# Patient Record
Sex: FEMALE | Race: WHITE
[De-identification: names, ages, dates, MRNs, and addresses within clinical notes are randomized per-mention and may not be internally consistent; named-entity substitution may affect disease eponyms.]

---

## 2017-10-28 ENCOUNTER — HOSPITAL ENCOUNTER (EMERGENCY)
Dept: HOSPITAL 75 - ER | Age: 37
Discharge: HOME | End: 2017-10-28
Payer: COMMERCIAL

## 2017-10-28 VITALS — BODY MASS INDEX: 22.76 KG/M2 | HEIGHT: 67 IN | WEIGHT: 145 LBS

## 2017-10-28 VITALS — SYSTOLIC BLOOD PRESSURE: 121 MMHG | DIASTOLIC BLOOD PRESSURE: 88 MMHG
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
## 2017-10-28 DIAGNOSIS — S93.401A: Primary | ICD-10-CM
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
## 2017-10-28 PROCEDURE — 73610 X-RAY EXAM OF ANKLE: CPT

## 2017-10-28 PROCEDURE — 99283 EMERGENCY DEPT VISIT LOW MDM: CPT

## 2017-10-28 NOTE — ED LOWER EXTREMITY
General


Chief Complaint:  Lower Extremity


Stated Complaint:  R FOOT PAIN FROM FALL


Source:  patient


Exam Limitations:  no limitations





History of Present Illness


Time seen by provider:  19:04


Initial Comments


To ER with right ankle pain after she fell off of her porch at home twisting 

her ankle on the way down and caught in the bushes.  She's been unable to bear 

weight on this since this happened.  Pain is only ankle.  There is no pain in 

the foot or in the knee.


Onset:  just prior to arrival


Severity:  moderate


Pain/Injury Location:  right ankle


Method of Injury:  fell, twisted


Modifying Factors:  Worse With Movement





Allergies and Home Medications


Allergies


Coded Allergies:  


     Morphine (Unverified  Allergy, Mild, 10/23/09)





Home Medications


Thyroid,Pork 130 Mg Tablet, (Reported)





Constitutional:  see HPI


EENTM:  see HPI


Respiratory:  no symptoms reported


Cardiovascular:  no symptoms reported


Genitourinary:  no symptoms reported


Musculoskeletal:  see HPI


Skin:  no symptoms reported


Psychiatric/Neurological:  No Symptoms Reported





Past Medical-Social-Family Hx


Patient Social History


Recent Foreign Travel:  No


Contact w/Someone Who Travel:  No





Reproductive System


Hx Reproductive Disorders:  No





Physical Exam


Vital Signs





Vital Sign - Last 12Hours








 10/28/17





 18:55


 


Temp 97.0


 


Pulse 95


 


Resp 20


 


B/P (MAP) 121/88


 


Pulse Ox 100


 


O2 Delivery Room Air





Capillary Refill :


General Appearance:  WD/WN, no apparent distress


HEENT:  PERRL/EOMI, normal ENT inspection


Neck:  non-tender, full range of motion


Respiratory:  no respiratory distress, no accessory muscle use


Hips:  bilateral hip non-tender, bilateral hip normal inspection, bilateral hip 

normal range of motion


Legs:  bilateral leg non-tender, bilateral leg normal inspection, bilateral leg 

normal range of motion


Knees:  bilateral knee non-tender, bilateral knee normal inspection, bilateral 

knee normal range of motion


Ankles:  right ankle pain, right ankle soft tissue tenderness, right ankle 

swelling


Feet:  bilateral foot non-tender, bilateral foot normal inspection, bilateral 

foot normal range of motion


Neurologic/Psychiatric:  alert, normal mood/affect, oriented x 3


Skin:  normal color, warm/dry


Comments


Distally she is neurovascularly intact





Progress/Results/Core Measures


Results/Orders


My Orders





Orders - SOILA MALDONADO


Ankle, Right, 3 Views (10/28/17 19:08)





Vital Signs/I&O





Vital Sign - Last 12Hours








 10/28/17





 18:55


 


Temp 97.0


 


Pulse 95


 


Resp 20


 


B/P (MAP) 121/88


 


Pulse Ox 100


 


O2 Delivery Room Air











Departure


Impression


Impression:  


 Primary Impression:  


 Ankle sprain


 Qualified Codes:  S93.401A - Sprain of unspecified ligament of right ankle, 

initial encounter


Disposition:  01 HOME, SELF-CARE


Condition:  Stable





Departure-Patient Inst.


Decision time for Depature:  19:27


Referrals:  


FANTASMA VIVAS DO (PCP)


Primary Care Physician








NAOMIE CAMPO DO (Family)


Primary Care Physician


Patient Instructions:  Ankle Sprain (DC)





Add. Discharge Instructions:  


1.  Rest, limit weightbearing.  It hurts to bear weight and use crutches when 

walking.  When you feel as though your able to bear weight on your left foot 

without pain in the ankle and you may do so.  Expect some swelling and bruising 

over the next 24-48 hours. Elevating the ankle, use of an ace wrap, and Ice 

pack will help with this. If pain persists beyond 1.5-2 weeks then follow up 

with Dr Vivas to discusse an MRI to further evaluate the ankle. Take tylenol 

and motrin for pain and return to ER for any concerning symptoms. Wear the ace 

wrap for a minimum of 48 hours and use an ice pack at 30 minute intervals for 

that same duration. 








All discharge instructions reviewed with patient and/or family. Voiced 

understanding.





Copy


Copies To 1:   FANTASMA VIVAS PETER J APRN Oct 28, 2017 19:05

## 2017-10-28 NOTE — DIAGNOSTIC IMAGING REPORT
INDICATION: Fall.



FINDINGS: There is mild soft tissue swelling of the lateral

malleolus. Ankle mortise is in good alignment. Articulating

surfaces are smooth. Joint spaces are well-preserved. There are

no fractures.



IMPRESSION: Mild soft tissue swelling with no bony abnormality.



Dictated by: 



  Dictated on workstation # [id number]

## 2017-11-28 ENCOUNTER — HOSPITAL ENCOUNTER (OUTPATIENT)
Dept: HOSPITAL 75 - ER | Age: 37
Setting detail: OBSERVATION
LOS: 2 days | Discharge: HOME | End: 2017-11-30
Attending: FAMILY MEDICINE | Admitting: FAMILY MEDICINE
Payer: COMMERCIAL

## 2017-11-28 VITALS — SYSTOLIC BLOOD PRESSURE: 101 MMHG | DIASTOLIC BLOOD PRESSURE: 75 MMHG

## 2017-11-28 VITALS — WEIGHT: 164 LBS | HEIGHT: 67 IN | BODY MASS INDEX: 25.74 KG/M2

## 2017-11-28 DIAGNOSIS — E03.9: ICD-10-CM

## 2017-11-28 DIAGNOSIS — N10: Primary | ICD-10-CM

## 2017-11-28 DIAGNOSIS — K52.9: ICD-10-CM

## 2017-11-28 DIAGNOSIS — Z23: ICD-10-CM

## 2017-11-28 DIAGNOSIS — E86.0: ICD-10-CM

## 2017-11-28 DIAGNOSIS — Z79.899: ICD-10-CM

## 2017-11-28 LAB
ALBUMIN SERPL-MCNC: 3.8 GM/DL (ref 3.2–4.5)
ALT SERPL-CCNC: 182 U/L (ref 0–55)
ANION GAP SERPL CALC-SCNC: 11 MMOL/L (ref 5–14)
APTT BLD: 26 SEC (ref 24–35)
AST SERPL-CCNC: 83 U/L (ref 5–34)
BASOPHILS # BLD AUTO: 0 10^3/UL (ref 0–0.1)
BASOPHILS NFR BLD AUTO: 0 % (ref 0–10)
BASOPHILS NFR BLD MANUAL: 0 %
BILIRUB SERPL-MCNC: 0.7 MG/DL (ref 0.1–1)
BILIRUB UR QL STRIP: NEGATIVE
BUN SERPL-MCNC: 17 MG/DL (ref 7–18)
BUN/CREAT SERPL: 22
CALCIUM SERPL-MCNC: 8.9 MG/DL (ref 8.5–10.1)
CHLORIDE SERPL-SCNC: 102 MMOL/L (ref 98–107)
CO2 SERPL-SCNC: 25 MMOL/L (ref 21–32)
CREAT SERPL-MCNC: 0.77 MG/DL (ref 0.6–1.3)
EOSINOPHIL # BLD AUTO: 0 10^3/UL (ref 0–0.3)
EOSINOPHIL NFR BLD AUTO: 0 % (ref 0–10)
EOSINOPHIL NFR BLD MANUAL: 0 %
ERYTHROCYTE [DISTWIDTH] IN BLOOD BY AUTOMATED COUNT: 12.5 % (ref 10–14.5)
GFR SERPLBLD BASED ON 1.73 SQ M-ARVRAT: > 60 ML/MIN
GLUCOSE SERPL-MCNC: 89 MG/DL (ref 70–105)
INR PPP: 0.9 (ref 0.8–1.4)
KETONES UR QL STRIP: NEGATIVE
LEUKOCYTE ESTERASE UR QL STRIP: (no result)
LYMPHOCYTES # BLD AUTO: 0.5 X 10^3 (ref 1–4)
LYMPHOCYTES NFR BLD AUTO: 6 % (ref 12–44)
MCH RBC QN AUTO: 30 PG (ref 25–34)
MCHC RBC AUTO-ENTMCNC: 35 G/DL (ref 32–36)
MCV RBC AUTO: 87 FL (ref 80–99)
MONOCYTES # BLD AUTO: 0.4 X 10^3 (ref 0–1)
MONOCYTES NFR BLD AUTO: 4 % (ref 0–12)
NEUTROPHILS # BLD AUTO: 8 X 10^3 (ref 1.8–7.8)
NEUTROPHILS NFR BLD AUTO: 90 % (ref 42–75)
NEUTS BAND NFR BLD MANUAL: 70 %
NEUTS BAND NFR BLD: 22 %
NITRITE UR QL STRIP: NEGATIVE
PH UR STRIP: 6 [PH] (ref 5–9)
PLATELET # BLD: 240 10^3/UL (ref 130–400)
PMV BLD AUTO: 9.5 FL (ref 7.4–10.4)
POTASSIUM SERPL-SCNC: 3.8 MMOL/L (ref 3.6–5)
PROT SERPL-MCNC: 6.3 GM/DL (ref 6.4–8.2)
PROT UR QL STRIP: NEGATIVE
PROTHROMBIN TIME: 11.8 SEC (ref 12.2–14.7)
RBC # BLD AUTO: 4.92 10^6/UL (ref 4.35–5.85)
SODIUM SERPL-SCNC: 138 MMOL/L (ref 135–145)
SP GR UR STRIP: 1.02 (ref 1.02–1.02)
SQUAMOUS #/AREA URNS HPF: >50 /HPF
UROBILINOGEN UR-MCNC: 1 MG/DL
VARIANT LYMPHS NFR BLD MANUAL: 8 %
WBC # BLD AUTO: 8.9 10^3/UL (ref 4.3–11)
WBC #/AREA URNS HPF: (no result) /HPF

## 2017-11-28 PROCEDURE — 87088 URINE BACTERIA CULTURE: CPT

## 2017-11-28 PROCEDURE — 87804 INFLUENZA ASSAY W/OPTIC: CPT

## 2017-11-28 PROCEDURE — 71010: CPT

## 2017-11-28 PROCEDURE — 83605 ASSAY OF LACTIC ACID: CPT

## 2017-11-28 PROCEDURE — 84443 ASSAY THYROID STIM HORMONE: CPT

## 2017-11-28 PROCEDURE — 85025 COMPLETE CBC W/AUTO DIFF WBC: CPT

## 2017-11-28 PROCEDURE — 74177 CT ABD & PELVIS W/CONTRAST: CPT

## 2017-11-28 PROCEDURE — 84703 CHORIONIC GONADOTROPIN ASSAY: CPT

## 2017-11-28 PROCEDURE — 85730 THROMBOPLASTIN TIME PARTIAL: CPT

## 2017-11-28 PROCEDURE — 87040 BLOOD CULTURE FOR BACTERIA: CPT

## 2017-11-28 PROCEDURE — 36415 COLL VENOUS BLD VENIPUNCTURE: CPT

## 2017-11-28 PROCEDURE — 84439 ASSAY OF FREE THYROXINE: CPT

## 2017-11-28 PROCEDURE — 85610 PROTHROMBIN TIME: CPT

## 2017-11-28 PROCEDURE — 80053 COMPREHEN METABOLIC PANEL: CPT

## 2017-11-28 PROCEDURE — 85027 COMPLETE CBC AUTOMATED: CPT

## 2017-11-28 PROCEDURE — 85007 BL SMEAR W/DIFF WBC COUNT: CPT

## 2017-11-28 PROCEDURE — 81000 URINALYSIS NONAUTO W/SCOPE: CPT

## 2017-11-28 NOTE — DIAGNOSTIC IMAGING REPORT
Portable erect AP chest at 744 hours.



INDICATION: Fever.



There are no prior studies available for comparison.



FINDINGS: The heart size is within normal limits. The lungs are

clear. There is no evidence for failure, pneumonia or for pleural

effusion. Mediastinum is not widened. The osseous structures are

intact. There is deformity of the left clavicle due to prior

trauma.



IMPRESSION: There is no evidence for an acute cardiopulmonary

abnormality.



Dictated by: 



  Dictated on workstation # [id number]

## 2017-11-28 NOTE — ED GENERAL
General


Chief Complaint:  Fever-Adult/Adol


Stated Complaint:  FEVER


Nursing Triage Note:  


PT HERE WITH C/O FEVER, N/V/ FOR 2 DAYS. FEVERS -103, NO URINE OUTPUT FOR 


22 HOURS, PT REPORTS TRAVELING OVER 1600 MILES THIS WEEK IN A CAR.


Nursing Sepsis Screen:  Possible Sepsis Risk


Source of Information:  Patient


Exam Limitations:  No Limitations





History of Present Illness


Time Seen by Provider:  19:11


Initial Comments


Here with report of fever with nausea and vomiting over the last 24-48 hours.  

States that she has not been him to keep anything down.  She has not urinated 

since last night.  Has had a lot of travel this week.  Within the last 2 weeks 

has been out of the country as well.  She has not had her flu shot this year.  

Denies specific pain but does complain of generalized body aches with the 

fever.  She was unable to keep Tylenol down tonight.  Denies diarrhea.


Timing/Duration:  1-2 Days


Severity:  Moderate


Associated Systoms:  No Chest Pain, Cough (mild intermittent), Fever/Chills, No 

Headaches, Nausea/Vomiting, No Shortness of Air, Weakness





Allergies and Home Medications


Allergies


Coded Allergies:  


     Morphine (Unverified  Allergy, Mild, 10/23/09)





Home Medications


Thyroid,Pork 130 Mg Tablet, (Reported)





Constitutional:  see HPI, chills, fever


EENTM:  no symptoms reported


Respiratory:  cough, No short of breath


Cardiovascular:  no symptoms reported


Gastrointestinal:  diarrhea, nausea, vomiting


Genitourinary:  decreased output, No pain


Pregnant:  No


LMP:  2017


Musculoskeletal:  muscle pain, muscle weakness


Skin:  no symptoms reported


All Other Systems Reviewed


Negative Unless Noted:  Yes





Past Medical-Social-Family Hx


Patient Social History


Alcohol Use:  Denies Use


Recreational Drug Use:  No


Smoking Status:  Never a Smoker


2nd Hand Smoke Exposure:  No


Recent Foreign Travel:  No


Contact w/Someone Who Travel:  No


Recent Infectious Disease Expo:  No


Recent Hopitalizations:  No





Seasonal Allergies


Seasonal Allergies:  No





Surgeries


History of Surgeries:  Yes (arm fx)


Surgeries:  Adenoidectomy, Gallbladder, Orthopedic, Tonsillectomy





Respiratory


History of Respiratory Disorde:  No





Cardiovascular


History of Cardiac Disorders:  No





Neurological


History of Neurological Disord:  No





Reproductive System


Hx Reproductive Disorders:  No





Genitourinary


History of Genitourinary Disor:  No





Gastrointestinal


History of Gastrointestinal Di:  No





Musculoskeletal


History of Musculoskeletal Dis:  No





Endocrine


History of Endocrine Disorders:  Yes


Endocrine Disorders:  Hypothyroidsim





HEENT


History of HEENT Disorders:  No





Cancer


History of Cancer:  No





Psychosocial


History of Psychiatric Problem:  No





Blood Transfusions


History of Blood Disorders:  No





Reviewed Nursing Assessment


Reviewed/Agree w Nursing PMH:  Yes





Family Medical History


Significant Family History:  No Pertinent Family Hx





Physical Exam-Suspected Sepsis


Physical Exam


Vital Signs





Vital Sign - Last 12Hours








 17





 18:59


 


Temp 98.8


 


Pulse 110


 


Resp 16


 


B/P (MAP) 123/79


 


Pulse Ox 98


 


O2 Delivery Room Air





Capillary Refill : Less Than 3 Seconds








Blood Pressure Mean:  94








General Appearance:  No Apparent Distress, WD/WN


HEENT:  PERRL/EOMI, Pharynx Normal


Neck:  Non Tender, Supple


Respiratory:  Lungs Clear, Normal Breath Sounds


Cardiovascular:  No Murmur, Tachycardia


Gastrointestinal:  Non Tender, Soft


Back:  Normal Inspection, No CVA Tenderness, No Vertebral Tenderness


Extremity:  Normal Range of Motion, Non Tender


Neurologic/Psychiatric:  Alert, Oriented x3


Skin:  normal color, warm/dry





Focused Exam


Evaluation


Lactate Level


Laboratory Tests


17 20:05: Lactic Acid Level 1.27





Lactic Acid Level





Laboratory Tests








Test


  17


20:05


 


Lactic Acid Level


  1.27 MMOL/L


(0.50-2.00)











Progress/Results/Core Measures


Suspected Sepsis


Recent Fever Within 48 Hours:  Yes


Infection Criteria Present:  Suspected New Infection


New/Unexplained  Altered Menta:  No


Sepsis Screen:  Possible Sepsis Risk


Sepsis Diagnosis:  


SIRS


Temperature:98.8 


Pulse: 110 


Respiratory Rate: 16


 


Laboratory Tests


17 19:25: White Blood Count 8.9


Blood Pressure 123 /79 


Mean: 94


 





Laboratory Tests


17 20:05: Lactic Acid Level 1.27


Laboratory Tests


17 19:25: 


Creatinine 0.77, Platelet Count 240, Total Bilirubin 0.7


17 20:05: INR Comment 0.9








Results/Orders


Lab Results





Laboratory Tests








Test


  17


19:25 17


20:05 17


21:50 Range/Units


 


 


White Blood Count


  8.9 


  


  


  4.3-11.0


10^3/uL


 


Red Blood Count


  4.92 


  


  


  4.35-5.85


10^6/uL


 


Hemoglobin 14.8    11.5-16.0  G/DL


 


Hematocrit 43    35-52  %


 


Mean Corpuscular Volume 87    80-99  FL


 


Mean Corpuscular Hemoglobin 30    25-34  PG


 


Mean Corpuscular Hemoglobin


Concent 35 


  


  


  32-36  G/DL


 


 


Red Cell Distribution Width 12.5    10.0-14.5  %


 


Platelet Count


  240 


  


  


  130-400


10^3/uL


 


Mean Platelet Volume 9.5    7.4-10.4  FL


 


Neutrophils (%) (Auto) 90 H   42-75  %


 


Lymphocytes (%) (Auto) 6 L   12-44  %


 


Monocytes (%) (Auto) 4    0-12  %


 


Eosinophils (%) (Auto) 0    0-10  %


 


Basophils (%) (Auto) 0    0-10  %


 


Neutrophils # (Auto) 8.0 H   1.8-7.8  X 10^3


 


Lymphocytes # (Auto) 0.5 L   1.0-4.0  X 10^3


 


Monocytes # (Auto) 0.4    0.0-1.0  X 10^3


 


Eosinophils # (Auto)


  0.0 


  


  


  0.0-0.3


10^3/uL


 


Basophils # (Auto)


  0.0 


  


  


  0.0-0.1


10^3/uL


 


Neutrophils % (Manual) 70     %


 


Lymphocytes % (Manual) 8     %


 


Monocytes % (Manual) 0     %


 


Eosinophils % (Manual) 0     %


 


Basophils % (Manual) 0     %


 


Band Neutrophils 22     %


 


Blood Morphology Comment NORMAL     


 


Sodium Level 138    135-145  MMOL/L


 


Potassium Level 3.8    3.6-5.0  MMOL/L


 


Chloride Level 102      MMOL/L


 


Carbon Dioxide Level 25    21-32  MMOL/L


 


Anion Gap 11    5-14  MMOL/L


 


Blood Urea Nitrogen 17    7-18  MG/DL


 


Creatinine


  0.77 


  


  


  0.60-1.30


MG/DL


 


Estimat Glomerular Filtration


Rate > 60 


  


  


   


 


 


BUN/Creatinine Ratio 22     


 


Glucose Level 89      MG/DL


 


Calcium Level 8.9    8.5-10.1  MG/DL


 


Total Bilirubin 0.7    0.1-1.0  MG/DL


 


Aspartate Amino Transf


(AST/SGOT) 83 H


  


  


  5-34  U/L


 


 


Alanine Aminotransferase


(ALT/SGPT) 182 H


  


  


  0-55  U/L


 


 


Alkaline Phosphatase 128      U/L


 


Total Protein 6.3 L   6.4-8.2  GM/DL


 


Albumin 3.8    3.2-4.5  GM/DL


 


Thyroid Stimulating Hormone


(TSH) 0.26 L


  


  


  0.35-4.94


UIU/ML


 


Free Thyroxine


  0.64 L


  


  


  0.70-1.48


NG/DL


 


Serum Pregnancy Test,


Qualitative NEGATIVE 


  


  


  NEGATIVE  


 


 


Prothrombin Time  11.8 L  12.2-14.7  SEC


 


INR Comment  0.9   0.8-1.4  


 


Activated Partial


Thromboplast Time 


  26 


  


  24-35  SEC


 


 


Lactic Acid Level


  


  1.27 


  


  0.50-2.00


MMOL/L


 


Urine Color   YELLOW   


 


Urine Clarity   VERY CLOUDY H  


 


Urine pH   6  5-9  


 


Urine Specific Gravity   1.020  1.016-1.022  


 


Urine Protein   NEGATIVE  NEGATIVE  


 


Urine Glucose (UA)   NEGATIVE  NEGATIVE  


 


Urine Ketones   NEGATIVE  NEGATIVE  


 


Urine Nitrite   NEGATIVE  NEGATIVE  


 


Urine Bilirubin   NEGATIVE  NEGATIVE  


 


Urine Urobilinogen   1  NORMAL  MG/DL


 


Urine Leukocyte Esterase   3+ H NEGATIVE  


 


Urine RBC (Auto)   1+ H NEGATIVE  


 


Urine RBC   2-5 H  /HPF


 


Urine WBC   5-10 H  /HPF


 


Urine Squamous Epithelial


Cells 


  


  >50 H


   /HPF


 


 


Urine Crystals   NONE   /LPF


 


Urine Bacteria   LARGE H  /HPF


 


Urine Casts   NONE   /LPF


 


Urine Mucus   NEGATIVE   /LPF


 


Urine Culture Indicated   YES   








Micro Results





Microbiology


17 Influenza Types A,B Antigen (YARELIS) - Final, Complete


           





My Orders





Orders - SABRINA BELLO MD


Cbc With Automated Diff (17 19:16)


Comprehensive Metabolic Panel (17 19:16)


Lactic Acid Analyzer (17 19:16)


Blood Culture (17 19:16)


Sputum Culture (17 19:16)


Ua Culture If Indicated (17 19:16)


Protime With Inr (17 19:16)


Partial Thromboplastin Time (17 19:16)


Chest 1 View, Ap/Pa Only (17 19:16)


O2 (17 19:16)


Saline Lock/Iv-Start (17 19:16)


Vital Signs Adult Sepsis Patie Q1H (17 19:16)


Remove Rings In Anticipation O (17 19:16)


Influenza A And B Antigens (17 19:16)


Ns Iv 1000 Ml (Sodium Chloride 0.9%) (17 19:16)


Ketorolac Injection (Toradol Injection) (17 19:16)


Hcg,Qualitative Serum (17 19:16)


Ondansetron Injection (Zofran Injectio (17 19:30)


Manual Differential (17 19:25)


Ns Iv 1000 Ml (Sodium Chloride 0.9%) (17 20:16)


Thyroid Stimulating Hormone (17 20:17)


Ns Iv 1000 Ml (Sodium Chloride 0.9%) (17 21:08)


Free T4 (Free Thyroxine) (17 21:09)


Acetaminophen  Tablet (Tylenol  Tablet) (17 21:16)


Urine Culture (17 21:50)


Ondansetron Injection (Zofran Injectio (17 22:30)


Ceftriaxone Injection (Rocephin Injectio (17 22:30)


Ct Abd/Pelv W (Appendicitis) (17 22:24)


Iohexol Injection (Omnipaque 350 Mg/Ml 1 (17 23:15)


Sodium Chloride Flush (Catheter Flush Sy (17 23:15)


Fentanyl  Injection (Sublimaze Injection (17 23:17)





Medications Given in ED





Current Medications








 Medications  Dose


 Ordered  Sig/Sung


 Route  Start Time


 Stop Time Status Last Admin


Dose Admin


 


 Ceftriaxone


 Sodium 1000 mg/


 Sodium Chloride  50 ml @ 


 100 mls/hr  ONCE  ONCE


 IV  17 22:30


 17 22:59 DC 17 22:30


100 MLS/HR


 


 Ondansetron HCl  4 mg  ONCE  ONCE


 IVP  17 19:30


 17 19:31 DC 17 19:33


4 MG


 


 Ondansetron HCl  4 mg  ONCE  ONCE


 IVP  17 22:30


 17 22:31 DC 17 22:30


4 MG


 


 Sodium Chloride  1,000 ml @ 


 0 mls/hr  Q0M ONCE


 IV  17 19:16


 17 19:19 DC 17 19:33


0 MLS/HR


 


 Sodium Chloride  1,000 ml @ 


 0 mls/hr  Q0M ONCE


 IV  17 20:16


 17 20:18 DC 17 20:19


0 MLS/HR


 


 Sodium Chloride  1,000 ml @ 


 0 mls/hr  Q0M ONCE


 IV  17 21:08


 17 21:10 DC 17 21:11


0 MLS/HR








Vital Signs/I&O





Vital Sign - Last 12Hours








 17





 18:59 19:25 19:34 20:22


 


Temp 98.8  98.8 98.5


 


Pulse 110   96


 


Resp 16   18


 


B/P (MAP) 123/79   


 


Pulse Ox 98 98  100


 


O2 Delivery Room Air Room Air  Room Air


 


    





 17  





 21:30 23:41  


 


Temp 98.5 98.8  


 


Pulse 89 96  


 


Resp 16 14  


 


B/P (MAP) 101/75   


 


Pulse Ox 98 96  


 


O2 Delivery Room Air Room Air  





Capillary Refill : Less Than 3 Seconds








Blood Pressure Mean:  94








Progress Note :  


Progress Note


Seen and evaluated.  IV, labs, UA, chest x-ray, influenza screen, blood cultures

, lactic acid, normal saline 1 L bolus, Toradol 30 mg IV and Zofran 4 mg IV 

ordered.  Monitor patient.  Repeat normal saline 1 L bolus as patient has not 

yet urinated.  Third liter of normal saline ordered after patient still could 

not urinate.  Bladder scanner shows bladder at 26 mL.  2225: CT abdomen and 

pelvis appendicitis protocol ordered.  Dr. Busch paged his pain is still 

persisting.  UA is now obtained and question urinary tract infection.  Patient 

does have a fairly significant left shift.  Rocephin 1 g IV has been ordered.  

2240: Discussed case with Dr. Busch and she accepts patient for admission, 

observation status.  2315: CT complete.  Pain is now localized to the abdomen 

and back.  Fentanyl 50 g IV ordered.  We will admit the patient, observation 

status.  Pending CT read.





Diagnostic Imaging





   Diagonstic Imaging:  Xray


   Plain Films/CT/US/NM/MRI:  chest


Comments


 VIA [facility], [facility].


 [place], [place]





NAME:   LEVI TOVAR


[facility] REC#:   [id number]


ACCOUNT#:   [id number]


PT STATUS:   REG ER


:   1980


PHYSICIAN:   SABRINA BELLO MD


ADMIT DATE:   17/ER


 ***Draft***


Date of Exam:17





CHEST 1 VIEW, AP/PA ONLY








Portable erect AP chest at 744 hours.





INDICATION: Fever.





There are no prior studies available for comparison.





FINDINGS: The heart size is within normal limits. The lungs are


clear. There is no evidence for failure, pneumonia or for pleural


effusion. Mediastinum is not widened. The osseous structures are


intact. There is deformity of the left clavicle due to prior


trauma.





IMPRESSION: There is no evidence for an acute cardiopulmonary


abnormality.





  Dictated on workstation # [id number]








Dict:   17


Trans:   17


 [id number]





Interpreted by:     BRIAN CHAUDHARI MD


Electronically signed by:








   [facility] Imaging:  CT


   Plain Films/CT/US/NM/MRI:  abdomen, pelvis


Comments


Findings suspicious for enteritis.  No definite obstruction throughout the 

though there are some mildly distended small bowel segments.  There are some 

nonspecific areas of decreased attenuation in the kidneys was somewhat 

straighter appearance the left kidney images 47 and 58.  Correlate for any 

evidence of pyelonephritis.


   Reviewed:  Reviewed Night [facility] Study





Departure


Communication (Admissions)


Time/Spoke to Admitting Phy:  22:40





Impression


Impression:  


 Primary Impression:  


 Fever


 Qualified Codes:  R50.9 - Fever, unspecified


 Additional Impressions:  


 Nausea and vomiting


 Qualified Codes:  R11.2 - Nausea with vomiting, unspecified


 Body aches


 Dehydration


 Urinary tract infection


 Qualified Codes:  N30.00 - Acute cystitis without hematuria


Disposition:   ADMITTED AS INPATIENT


Condition:  Stable





Admissions


Decision to Admit Reason:  Admit from ER (General)


Decision to Admit/Date:  2017


Time/Decision to Admit Time:  22:40





Departure-Patient Inst.


Referrals:  


FANTASMA BUSCH DO (PCP)


Primary Care Physician








NAOMIE CAMPO DO (Family)


Primary Care Physician











SABRINA BELLO MD 2017 19:26

## 2017-11-29 VITALS — SYSTOLIC BLOOD PRESSURE: 99 MMHG | DIASTOLIC BLOOD PRESSURE: 61 MMHG

## 2017-11-29 VITALS — DIASTOLIC BLOOD PRESSURE: 72 MMHG | SYSTOLIC BLOOD PRESSURE: 105 MMHG

## 2017-11-29 VITALS — SYSTOLIC BLOOD PRESSURE: 90 MMHG | DIASTOLIC BLOOD PRESSURE: 64 MMHG

## 2017-11-29 VITALS — SYSTOLIC BLOOD PRESSURE: 98 MMHG | DIASTOLIC BLOOD PRESSURE: 65 MMHG

## 2017-11-29 VITALS — DIASTOLIC BLOOD PRESSURE: 67 MMHG | SYSTOLIC BLOOD PRESSURE: 95 MMHG

## 2017-11-29 VITALS — DIASTOLIC BLOOD PRESSURE: 67 MMHG | SYSTOLIC BLOOD PRESSURE: 100 MMHG

## 2017-11-29 LAB
ALBUMIN SERPL-MCNC: 2.8 GM/DL (ref 3.2–4.5)
ALT SERPL-CCNC: 146 U/L (ref 0–55)
ANION GAP SERPL CALC-SCNC: 6 MMOL/L (ref 5–14)
AST SERPL-CCNC: 72 U/L (ref 5–34)
BASOPHILS # BLD AUTO: 0 10^3/UL (ref 0–0.1)
BASOPHILS NFR BLD AUTO: 0 % (ref 0–10)
BILIRUB SERPL-MCNC: 0.5 MG/DL (ref 0.1–1)
BUN SERPL-MCNC: 10 MG/DL (ref 7–18)
BUN/CREAT SERPL: 15
CALCIUM SERPL-MCNC: 7.2 MG/DL (ref 8.5–10.1)
CHLORIDE SERPL-SCNC: 109 MMOL/L (ref 98–107)
CO2 SERPL-SCNC: 23 MMOL/L (ref 21–32)
CREAT SERPL-MCNC: 0.65 MG/DL (ref 0.6–1.3)
EOSINOPHIL # BLD AUTO: 0.1 10^3/UL (ref 0–0.3)
EOSINOPHIL NFR BLD AUTO: 1 % (ref 0–10)
ERYTHROCYTE [DISTWIDTH] IN BLOOD BY AUTOMATED COUNT: 12.4 % (ref 10–14.5)
GFR SERPLBLD BASED ON 1.73 SQ M-ARVRAT: > 60 ML/MIN
GLUCOSE SERPL-MCNC: 88 MG/DL (ref 70–105)
LYMPHOCYTES # BLD AUTO: 0.9 X 10^3 (ref 1–4)
LYMPHOCYTES NFR BLD AUTO: 16 % (ref 12–44)
MCH RBC QN AUTO: 30 PG (ref 25–34)
MCHC RBC AUTO-ENTMCNC: 34 G/DL (ref 32–36)
MCV RBC AUTO: 88 FL (ref 80–99)
MONOCYTES # BLD AUTO: 0.4 X 10^3 (ref 0–1)
MONOCYTES NFR BLD AUTO: 7 % (ref 0–12)
NEUTROPHILS # BLD AUTO: 4.4 X 10^3 (ref 1.8–7.8)
NEUTROPHILS NFR BLD AUTO: 76 % (ref 42–75)
PLATELET # BLD: 218 10^3/UL (ref 130–400)
PMV BLD AUTO: 9.2 FL (ref 7.4–10.4)
POTASSIUM SERPL-SCNC: 3.4 MMOL/L (ref 3.6–5)
PROT SERPL-MCNC: 4.8 GM/DL (ref 6.4–8.2)
RBC # BLD AUTO: 4.1 10^6/UL (ref 4.35–5.85)
SODIUM SERPL-SCNC: 138 MMOL/L (ref 135–145)
WBC # BLD AUTO: 5.8 10^3/UL (ref 4.3–11)

## 2017-11-29 RX ADMIN — SODIUM CHLORIDE SCH MLS/HR: 900 INJECTION, SOLUTION INTRAVENOUS at 16:15

## 2017-11-29 RX ADMIN — Medication SCH ML: at 14:04

## 2017-11-29 RX ADMIN — SODIUM CHLORIDE SCH MLS/HR: 900 INJECTION, SOLUTION INTRAVENOUS at 05:51

## 2017-11-29 RX ADMIN — Medication SCH ML: at 06:03

## 2017-11-29 RX ADMIN — SODIUM CHLORIDE SCH MLS/HR: 900 INJECTION, SOLUTION INTRAVENOUS at 08:05

## 2017-11-29 NOTE — HISTORY & PHYSICIAL
History of Present Illness


History of Present Illness


Reason for visit/HPI


This is a 37 year old female who presented to the emergency room with a 2 day 

history of intractable nausea and vomiting.  She was running a fever with body 

aches and abdominal pain and had not urinated for almost 24hrs.  She required 3 

liters of fluids in the emergency room before she was able to give a urine 

sampled.  She also was given IV zofran but was still having ongoing nausea.  

Her urinalysis did show a UTI and her CT scan of the abdomen and pelvis showed 

enteritis with possible early pyelonephritis.  It was decided she should be 

admitted for IVF rehydration, IV antiemetics and IV rocephin.


Date of Admission


2017 at 10:39 pm


Date Seen by Provider:  2017


Time Seen by Provider:  08:45


I consulted on this patient on


17


 17:29


Attending Physician


Olga Busch DO


Admitting Physician


Olga Busch DO


Consult








Allergies and Home Medications


Allergies


Coded Allergies:  


     morphine (Unverified  Allergy, Mild, 10/23/09)





Home Medications


Cefuroxime Axetil 500 Mg Tablet, 500 MG PO BID, #10


   Prescribed by: OLGA BUSCH on 17 1722


Ondansetron HCl 8 Mg Tablet, 8 MG PO Q6H, #20


   Prescribed by: OLGA BUSCH on 17 1721


Thyroid,Pork 130 Mg Tablet, 130 MG PO DAILY, (Reported)





Past Medical-Social-Family Hx


Patient Social History


Alcohol Use:  Denies Use


Recreational Drug Use:  No


Smoking Status:  Never a Smoker


2nd Hand Smoke Exposure:  No


Physical Abuse Screen:  No


Sexual Abuse:  No


Recent Foreign Travel:  Yes


Contact w/other who traveled:  Yes


Recent Hopitalizations:  No


Recent Infectious Disease Expo:  No





Seasonal Allergies


Seasonal Allergies:  No





Surgeries


Yes (arm fx)


Adenoidectomy, Gallbladder, Orthopedic, Tonsillectomy





Respiratory


No





Cardiovascular


No





Neurological


No





Reproductive System


Hx Reproductive Disorders:  No





Genitourinary


No





Gastrointestinal


No





Musculoskeletal


No





Endocrine


History of Endocrine Disorders:  Yes


Endocrine Disorders:  Hypothyroidsim





HEENT


History of HEENT Disorders:  No





Cancer


No





Psychosocial


History of Psychiatric Problem:  No





Integumentary


History of Skin or Integumenta:  No





Blood Transfusions


History of Blood Disorders:  No





Reviewed Nursing Assessment


Reviewed/Agree w Nursing PMH:  Yes





Family Medical History


Significant Family History:  No Pertinent Family Hx





Constitutional:  fever, weakness


EENTM:  No see HPI, No no symptoms reported, No ear discharge, No hearing loss, 

No ear pain, No blurred vision, No double vision, No eye pain, No tearing, No 

vision loss, No dental problems, No hoarseness, No mouth pain, No mouth swelling

, No epistaxis, No nose congestion, No nose pain, No throat pain, No throat 

swelling, No other


Respiratory:  No no symptoms reported, No see HPI, No cough, No dyspnea on 

exertion, No hemoptysis, No orthopnea, No phlegm, No short of breath, No stridor

, No wheezing, No other


Cardiovascular:  No no symptoms reported, No see HPI, No chest pain, No edema, 

No Hx of Intervention, No palpitations, No syncope, No vascular heart diseas, 

No other


Gastrointestinal:  RLQ, abdominal pain, loss of appetite, nausea, vomiting


Genitourinary:  decreased output


Musculoskeletal:  back pain (right sided)


Skin:  No no symptoms reported, No see HPI, No change in color, No change in 

hair/nails, No dryness, No hx of skin cancer, No lesions, No lumps, No pruritus

, No rash, No other


Psychiatric/Neurological:  Weakness





Physical Exam


Vital Signs





Vital Sign - Last 12Hours








 17





 18:59


 


Temp 98.8


 


Pulse 110


 


Resp 16


 


B/P (MAP) 123/79


 


Pulse Ox 98


 


O2 Delivery Room Air





Capillary Refill : Less Than 3 Seconds


General Appearance:  No Apparent Distress


HEENT:  Pharynx Normal


Neck:  Supple


Respiratory:  Lungs Clear


Gastrointestinal:  Normal Bowel Sounds, Soft, Tenderness


Back:  CVA Tenderness (R)


Extremity:  Non Tender, No Calf Tenderness, No Pedal Edema


Neurologic/Psychiatric:  Alert, Oriented x3


Skin:  Normal Color, Warm/Dry


Comments


Laboratory Tests


17 19:25: 


White Blood Count 8.9, Red Blood Count 4.92, Hemoglobin 14.8, Hematocrit 43, 

Mean Corpuscular Volume 87, Mean Corpuscular Hemoglobin 30, Mean Corpuscular 

Hemoglobin Concent 35, Red Cell Distribution Width 12.5, Platelet Count 240, 

Mean Platelet Volume 9.5, Neutrophils (%) (Auto) 90H, Lymphocytes (%) (Auto) 6L

, Monocytes (%) (Auto) 4, Eosinophils (%) (Auto) 0, Basophils (%) (Auto) 0, 

Neutrophils # (Auto) 8.0H, Lymphocytes # (Auto) 0.5L, Monocytes # (Auto) 0.4, 

Eosinophils # (Auto) 0.0, Basophils # (Auto) 0.0, Neutrophils % (Manual) 70, 

Lymphocytes % (Manual) 8, Monocytes % (Manual) 0, Eosinophils % (Manual) 0, 

Basophils % (Manual) 0, Band Neutrophils 22, Blood Morphology Comment NORMAL, 

Sodium Level 138, Potassium Level 3.8, Chloride Level 102, Carbon Dioxide Level 

25, Anion Gap 11, Blood Urea Nitrogen 17, Creatinine 0.77, Estimat Glomerular 

Filtration Rate > 60, BUN/Creatinine Ratio 22, Glucose Level 89, Calcium Level 

8.9, Total Bilirubin 0.7, Aspartate Amino Transf (AST/SGOT) 83H, Alanine 

Aminotransferase (ALT/SGPT) 182H, Alkaline Phosphatase 128, Total Protein 6.3L, 

Albumin 3.8, Thyroid Stimulating Hormone (TSH) 0.26L, Free Thyroxine 0.64L, 

Serum Pregnancy Test, Qualitative NEGATIVE


17 20:05: 


Prothrombin Time 11.8L, INR Comment 0.9, Activated Partial Thromboplast Time 26

, Lactic Acid Level 1.27


17 21:50: 


Urine Color YELLOW, Urine Clarity VERY CLOUDYH, Urine pH 6, Urine Specific 

Gravity 1.020, Urine Protein NEGATIVE, Urine Glucose (UA) NEGATIVE, Urine 

Ketones NEGATIVE, Urine Nitrite NEGATIVE, Urine Bilirubin NEGATIVE, Urine 

Urobilinogen 1, Urine Leukocyte Esterase 3+H, Urine RBC (Auto) 1+H, Urine RBC 2-

5H, Urine WBC 5-10H, Urine Squamous Epithelial Cells >50H, Urine Crystals NONE, 

Urine Bacteria LARGEH, Urine Casts NONE, Urine Mucus NEGATIVE, Urine Culture 

Indicated YES


17 06:43: 


White Blood Count 5.8, Red Blood Count 4.10L, Hemoglobin 12.1, Hematocrit 36, 

Mean Corpuscular Volume 88, Mean Corpuscular Hemoglobin 30, Mean Corpuscular 

Hemoglobin Concent 34, Red Cell Distribution Width 12.4, Platelet Count 218, 

Mean Platelet Volume 9.2, Neutrophils (%) (Auto) 76H, Lymphocytes (%) (Auto) 16

, Monocytes (%) (Auto) 7, Eosinophils (%) (Auto) 1, Basophils (%) (Auto) 0, 

Neutrophils # (Auto) 4.4, Lymphocytes # (Auto) 0.9L, Monocytes # (Auto) 0.4, 

Eosinophils # (Auto) 0.1, Basophils # (Auto) 0.0, Sodium Level 138, Potassium 

Level 3.4L, Chloride Level 109H, Carbon Dioxide Level 23, Anion Gap 6, Blood 

Urea Nitrogen 10, Creatinine 0.65, Estimat Glomerular Filtration Rate > 60, BUN/

Creatinine Ratio 15, Glucose Level 88, Calcium Level 7.2L, Total Bilirubin 0.5, 

Aspartate Amino Transf (AST/SGOT) 72H, Alanine Aminotransferase (ALT/SGPT) 146H

, Alkaline Phosphatase 108, Total Protein 4.8L, Albumin 2.8L





Microbiology


17 Blood Culture - Preliminary, Resulted


           No growth


17 Influenza Types A,B Antigen (YARELIS) - Final, Complete


           


17 Urine Culture - Preliminary, Resulted


           NO GROWTH





Assessment/Plan


Assessment and Plan


1.  Acute Pyelonephritis--admit for IV rocephin


2.  Acute Gastroenteritis with Intractable N/V and Dehydration--admit for IVF 

and IV antiemetics


3.  Abnormal Thyroid Lab--patient getting thyroid from another physician--may 

be off some due to viral illness as well


Problems:  





Clinical Quality Measures


DVT/VTE Risk/Contraindication:


Risk Factor Score Per Nursin


RFS Level Per Nursing on Admit:  1=Low/No VTE PPX











OLGA BUSCH DO 2017 5:34 pm

## 2017-11-29 NOTE — DIAGNOSTIC IMAGING REPORT
PROCEDURE: CT abdomen and pelvis with contrast, rule out

appendicitis.



TECHNIQUE: Multiple contiguous axial images were obtained through

the abdomen and pelvis after the administration of intravenous

contrast.



INDICATION: Febrile. Abdominal pain with nausea and vomiting x2

days. History of cholecystectomy.



Comparison with 10/26/2009.



FINDINGS: Lung bases are clear. Liver appears normal. Gallbladder

is absent. Bile ducts are not dilated. The pancreas and spleen

are normal. The adrenal glands are normal. Kidneys show no

evidence of obstruction. Contrasted images only exclude

evaluation for calculi. There are no renal masses. The

preliminary report described striated enhancement on the

nephrogram effect of the kidneys. This is a subtle finding. There

is no perinephric edema or thickening of Gerota's fascia. The

appendix is not dilated. No appendicoliths are present. There is

normal enhancement of the abdominal organs following IV contrast.

The stomach is not distended. There are fluid-filled loops of

small bowel which are mildly distended throughout the abdomen.

There is no definite transition point. Colon shows normal stool

and gas pattern. There is no free air or free fluid. No pelvic

mass. Uterus appears normal. There are no bony abnormalities.



IMPRESSION:

1. Fluid-filled small bowel which is mildly distended likely

representing enteritis. No definite small bowel obstruction

demonstrated.

2. Questionable striation of the right kidney following IV

contrast. Rule out UTI. 

3. Appendix is normal.



These findings are concordant with the preliminary report.











Dictated by: 



  Dictated on workstation # [id number]

## 2017-11-29 NOTE — DISCHARGE INST-SIMPLE/STANDARD
Discharge Inst-Standard


Discharge Medications


New, Converted or Re-Newed RX:  Transmitted to Pharmacy





Patient Instructions/Follow Up


Plan of Care/Instructions/FU:  


Fwup 1 week


Activity as Tolerated:  Yes


Discharge Diet:  Regular Diet


Other Inst to Patient


Hold supplements and Triam/HCTZ until done with antibiotics











FANTASMA VIVAS DO Nov 29, 2017 17:25

## 2017-11-30 VITALS — DIASTOLIC BLOOD PRESSURE: 78 MMHG | SYSTOLIC BLOOD PRESSURE: 124 MMHG

## 2017-11-30 VITALS — DIASTOLIC BLOOD PRESSURE: 76 MMHG | SYSTOLIC BLOOD PRESSURE: 111 MMHG

## 2017-11-30 VITALS — SYSTOLIC BLOOD PRESSURE: 107 MMHG | DIASTOLIC BLOOD PRESSURE: 70 MMHG

## 2017-11-30 RX ADMIN — SODIUM CHLORIDE SCH MLS/HR: 900 INJECTION, SOLUTION INTRAVENOUS at 01:29

## 2017-11-30 RX ADMIN — Medication SCH ML: at 00:42

## 2017-12-01 NOTE — DISCHARGE SUMMARY
Diagnosis/Chief Complaint


Date of Admission


2017 at 10:39 pm


Date of Discharge


2017 at 9:19 am


Discharge Date:  2017


Admission Diagnosis


Admission Diagnosis


1.  Acute Pyelonephritis--admit for IV rocephin


2.  Acute Gastroenteritis with Intractable N/V and Dehydration--admit for IVF 

and IV antiemetics


3.  Abnormal Thyroid Lab--patient getting thyroid from another physician--may 

be off some due to viral illness as well





Discharge Diagnosis


1.  Acute Pyelonephritis--right sided abdominal pain improving


2.  Acute Gastroenteritis with Intractable N/V and Dehydration--improved


3.  Abnormal Thyroid Lab--patient getting thyroid from another physician--may 

be off some due to viral illness as well





Reason Hospital Visit


This is a 37 year old female who presented to the emergency room with a 2 day 

history of intractable nausea and vomiting.  She was running a fever with body 

aches and abdominal pain and had not urinated for almost 24hrs.  She required 3 

liters of fluids in the emergency room before she was able to give a urine 

sampled.  She also was given IV zofran but was still having ongoing nausea.  

Her urinalysis did show a UTI and her CT scan of the abdomen and pelvis showed 

enteritis with possible early pyelonephritis.  It was decided she should be 

admitted for IVF rehydration, IV antiemetics and IV rocephin.





Discharge Summary


Hospital Course


Hospital Course


This is a 37 year old female who presented to the emergency room with a 2 day 

history of intractable nausea and vomiting.  She was running a fever with body 

aches and abdominal pain and had not urinated for almost 24hrs.  She required 3 

liters of fluids in the emergency room before she was able to give a urine 

sampled.  She also was given IV zofran but was still having ongoing nausea.  

Her urinalysis did show a UTI and her CT scan of the abdomen and pelvis showed 

enteritis with possible early pyelonephritis.  It was decided she should be 

admitted for IVF rehydration, IV antiemetics and IV rocephin.  By the following 

hospital day her nausea was improved and she was tolerating clear liquids so 

her diet was advanced as tolerated.  She tolerated the diet advancement with no 

increase in nausea.  However, she continued to have right sided abdominal pain 

and despite aggressive IVF rehydration, she still had low urine output.  It was 

decided to keep her one more day for further IVFs and further rocephin to may 

sure her pyelonephritis was properly treated.  By the following hospital day, 

her right sided abdominal pain was improved and she was urinating more 

frequently and felt much better.  It was decided she could be discharged home 

on oral antibiotics.  She is instructed to hold her supplements and Triam/HCTZ 

which she only takes for swelling through the rest of the weekend then resume 

them Monday if she was feeling back to normal.


Labs


Laboratory Tests


17 19:25: 


Neutrophils (%) (Auto) 90H, Lymphocytes (%) (Auto) 6L, Neutrophils # (Auto) 8.0H

, Lymphocytes # (Auto) 0.5L, Aspartate Amino Transf (AST/SGOT) 83H, Alanine 

Aminotransferase (ALT/SGPT) 182H, Total Protein 6.3L, Thyroid Stimulating 

Hormone (TSH) 0.26L, Free Thyroxine 0.64L


17 20:05: Prothrombin Time 11.8L


17 21:50: 


Urine Clarity VERY CLOUDYH, Urine Leukocyte Esterase 3+H, Urine RBC (Auto) 1+H, 

Urine RBC 2-5H, Urine WBC 5-10H, Urine Squamous Epithelial Cells >50H, Urine 

Bacteria LARGEH


17 06:43: 


Neutrophils (%) (Auto) 76H, Lymphocytes # (Auto) 0.9L, Aspartate Amino Transf (

AST/SGOT) 72H, Alanine Aminotransferase (ALT/SGPT) 146H, Total Protein 4.8L, 

Red Blood Count 4.10L, Potassium Level 3.4L, Chloride Level 109H, Calcium Level 

7.2L, Albumin 2.8L





Procedures


None.





Discharge Physical Examination


Allergies:  


Coded Allergies:  


     morphine (Unverified  Allergy, Mild, 10/23/09)


Vitals & I&Os





Vital Signs








  Date Time  Temp Pulse Resp B/P (MAP) Pulse Ox O2 Delivery O2 Flow Rate FiO2


 


17 09:45  62 14 111/76 98 Room Air  


 


17 08:00 97.6       








General Appearance:  Alert, Oriented X3, Cooperative, No Acute Distress


Respiratory:  Clear to Auscultation


Cardiovascular:  Regular Rate


Abdominal:  Normal Bowel Sounds, Soft, No Tenderness


Extremities:  No Clubbing, No Cyanosis, No Edema


Skin:  No Rashes


Psych/Mental Status:  Mental Status NL, Mood NL





Discharge


Home Medications


Reviewed and agree with Discharge Medication list on patient's Discharge 

Instruction sheet


Instructions to Patient/Family


Please see electronic discharge instructions given to patient.





Clinical Quality Measures


DVT/VTE Risk/Contraindication:


Risk Factor Score Per Nursin


RFS Level Per Nursing on Admit:  1=Low/No VTE PPX











FANTASMA VIVAS DO Dec 1, 2017 3:05 pm

## 2020-09-21 ENCOUNTER — HOSPITAL ENCOUNTER (EMERGENCY)
Dept: HOSPITAL 75 - ER | Age: 40
Discharge: HOME | End: 2020-09-21
Payer: COMMERCIAL

## 2020-09-21 VITALS — DIASTOLIC BLOOD PRESSURE: 97 MMHG | SYSTOLIC BLOOD PRESSURE: 117 MMHG

## 2020-09-21 VITALS — HEIGHT: 66.97 IN | WEIGHT: 154.98 LBS | BODY MASS INDEX: 24.33 KG/M2

## 2020-09-21 DIAGNOSIS — Z88.5: ICD-10-CM

## 2020-09-21 DIAGNOSIS — R10.31: Primary | ICD-10-CM

## 2020-09-21 DIAGNOSIS — N39.0: ICD-10-CM

## 2020-09-21 DIAGNOSIS — N83.01: ICD-10-CM

## 2020-09-21 LAB
ALBUMIN SERPL-MCNC: 4.3 GM/DL (ref 3.2–4.5)
ALP SERPL-CCNC: 96 U/L (ref 40–136)
ALT SERPL-CCNC: 94 U/L (ref 0–55)
AMYLASE SERPL-CCNC: 48 U/L (ref 25–125)
APTT PPP: YELLOW S
BACTERIA #/AREA URNS HPF: (no result) /HPF
BASOPHILS # BLD AUTO: 0 10^3/UL (ref 0–0.1)
BASOPHILS NFR BLD AUTO: 0 % (ref 0–10)
BILIRUB SERPL-MCNC: 0.2 MG/DL (ref 0.1–1)
BILIRUB UR QL STRIP: NEGATIVE
BUN/CREAT SERPL: 22
CALCIUM SERPL-MCNC: 8.8 MG/DL (ref 8.5–10.1)
CHLORIDE SERPL-SCNC: 103 MMOL/L (ref 98–107)
CO2 SERPL-SCNC: 27 MMOL/L (ref 21–32)
CREAT SERPL-MCNC: 0.98 MG/DL (ref 0.6–1.3)
EOSINOPHIL # BLD AUTO: 0.1 10^3/UL (ref 0–0.3)
EOSINOPHIL NFR BLD AUTO: 2 % (ref 0–10)
FIBRINOGEN PPP-MCNC: (no result) MG/DL
GFR SERPLBLD BASED ON 1.73 SQ M-ARVRAT: > 60 ML/MIN
GLUCOSE SERPL-MCNC: 81 MG/DL (ref 70–105)
GLUCOSE UR STRIP-MCNC: NEGATIVE MG/DL
HCT VFR BLD CALC: 45 % (ref 35–52)
HGB BLD-MCNC: 15.2 G/DL (ref 11.5–16)
KETONES UR QL STRIP: NEGATIVE
LEUKOCYTE ESTERASE UR QL STRIP: (no result)
LIPASE SERPL-CCNC: 51 U/L (ref 8–78)
LYMPHOCYTES # BLD AUTO: 3 X 10^3 (ref 1–4)
LYMPHOCYTES NFR BLD AUTO: 37 % (ref 12–44)
MANUAL DIFFERENTIAL PERFORMED BLD QL: NO
MCH RBC QN AUTO: 30 PG (ref 25–34)
MCHC RBC AUTO-ENTMCNC: 34 G/DL (ref 32–36)
MCV RBC AUTO: 87 FL (ref 80–99)
MONOCYTES # BLD AUTO: 0.8 X 10^3 (ref 0–1)
MONOCYTES NFR BLD AUTO: 10 % (ref 0–12)
NEUTROPHILS # BLD AUTO: 4.1 X 10^3 (ref 1.8–7.8)
NEUTROPHILS NFR BLD AUTO: 51 % (ref 42–75)
NITRITE UR QL STRIP: NEGATIVE
PH UR STRIP: 7 [PH] (ref 5–9)
PLATELET # BLD: 330 10^3/UL (ref 130–400)
PMV BLD AUTO: 8.8 FL (ref 7.4–10.4)
POTASSIUM SERPL-SCNC: 3.5 MMOL/L (ref 3.6–5)
PROT SERPL-MCNC: 7.1 GM/DL (ref 6.4–8.2)
PROT UR QL STRIP: NEGATIVE
RBC #/AREA URNS HPF: (no result) /HPF
SODIUM SERPL-SCNC: 140 MMOL/L (ref 135–145)
SP GR UR STRIP: 1.01 (ref 1.02–1.02)
WBC # BLD AUTO: 8.1 10^3/UL (ref 4.3–11)
WBC #/AREA URNS HPF: (no result) /HPF

## 2020-09-21 PROCEDURE — 80053 COMPREHEN METABOLIC PANEL: CPT

## 2020-09-21 PROCEDURE — 36415 COLL VENOUS BLD VENIPUNCTURE: CPT

## 2020-09-21 PROCEDURE — 81000 URINALYSIS NONAUTO W/SCOPE: CPT

## 2020-09-21 PROCEDURE — 87088 URINE BACTERIA CULTURE: CPT

## 2020-09-21 PROCEDURE — 85025 COMPLETE CBC W/AUTO DIFF WBC: CPT

## 2020-09-21 PROCEDURE — 83690 ASSAY OF LIPASE: CPT

## 2020-09-21 PROCEDURE — 84703 CHORIONIC GONADOTROPIN ASSAY: CPT

## 2020-09-21 PROCEDURE — 74177 CT ABD & PELVIS W/CONTRAST: CPT

## 2020-09-21 PROCEDURE — 74022 RADEX COMPL AQT ABD SERIES: CPT

## 2020-09-21 PROCEDURE — 82150 ASSAY OF AMYLASE: CPT

## 2020-09-21 NOTE — DIAGNOSTIC IMAGING REPORT
EXAMINATION: Abdominal radiographs, acute series.



DATE: September 21, 2020. 



CLINICAL INDICATION: 40-year-old female, abdominal pain.



COMPARISON: CT abdomen and pelvis November 28, 2017.



COMMENTS: Heart size and mediastinal contours are unremarkable.

There is no identified pneumothorax. There is no large pleural

effusion. There is no identified focal airspace consolidation.

There is no identified free intraperitoneal air. There are right

upper quadrant surgical clips likely relating to prior

cholecystectomy. There are gas-filled segments of large bowel

which are not abnormally distended. There is a mild volume

colonic stool. There is no identified abnormal radiodensity

projecting over the expected positions of the kidneys or ureters

or in right lower quadrant. There are pelvic calcifications

likely relating to phleboliths.



IMPRESSION:  



1. No identified acute abdominal radiographic abnormality.

2. No identified acute cardiopulmonary abnormality.



Dictated by: 



  Dictated on workstation # [id number]

## 2020-09-21 NOTE — ED ABDOMINAL PAIN
General


Chief Complaint:  Abdominal/GI Problems


Stated Complaint:  ABD PAIN;BACK PAIN


Source of Information:  Patient





History of Present Illness


Date Seen by Provider:  Sep 21, 2020


Time Seen by Provider:  18:35


Initial Comments


PT ARRIVES VIA POV FROM HOME


C/O RLQ PAIN AND RIGHT FLANK PAIN SINCE YESTERDAY


PAIN IS WORSE TODAY


PAIN IS WORSE WITH ANY MOVEMENTS, JARRING, ETC. 


DROVE HOME FROM  TODAY AND PAIN WAS MUCH WORSE WITH CAR RIDE/BUMPS IN ROAD, 

ETC


C/O NAUSEA, NO VOMITING. LAST BM 2 DAYS AGO--CONSTIPATED


NO FEVER


NO URINARY SYMPTOMS





HAS NOT TAKEN ANYTHING FOR PAIN 


NO HISTORY OF SIMILAR





LAST FOOD INTAKE WAS RICE AROUND NOON





PT HAS HAD CHOLECYSTECTOMY, TUMMY TUCK, AND BTL





LMP 09/05/20. NORMAL.





PCP:





Allergies and Home Medications


Allergies


Coded Allergies:  


     morphine (Unverified  Allergy, Mild, 10/23/09)





Home Medications


Cefuroxime Axetil 500 Mg Tablet, 500 MG PO BID


   Prescribed by: FANTASMA VIVAS on 11/29/17 1722


Ondansetron HCl 8 Mg Tablet, 8 MG PO Q6H


   Prescribed by: FANTASMA VIVAS on 11/29/17 1721


Thyroid,Pork 130 Mg Tablet, 130 MG PO DAILY, (Reported)





Patient Home Medication List


Home Medication List Reviewed:  Yes





Review of Systems


Review of Systems


Constitutional:  no symptoms reported; No chills, No diaphoresis, No fever


Respiratory:  No Symptoms Reported


Cardiovascular:  No Symptoms Reported


Gastrointestinal:  See HPI, Abdominal Pain, Constipated, Nausea, Poor Appetite; 

Denies Vomiting


Genitourinary:  Denies Burning, Denies Discharge, Denies Drainage, Denies 

Frequency; Flank Pain; Denies Hematuria, Denies Pain, Denies Urgency


Musculoskeletal:  see HPI, back pain


Skin:  no symptoms reported; No rash


Psychiatric/Neurological:  No Symptoms Reported


Endocrine:  No Symptoms Reported


Hematologic/Lymphatic:  No Symptoms Reported





Past Medical-Social-Family Hx


Past Med/Social Hx:  Reviewed and Corrections made


Patient Social History


Alcohol Use:  Denies Use


Recreational Drug Use:  No


Smoking Status:  Never a Smoker


2nd Hand Smoke Exposure:  No


Recent Foreign Travel:  No


Contact w/Someone Who Travel:  No


Recent Hopitalizations:  No


Physical Abuse:  No


Sexual Abuse:  No


Mistreated:  No


Fear:  No





Seasonal Allergies


Seasonal Allergies:  No





Past Medical History


Surgeries:  Yes (TUMMY TUCK/ABDOMINOPLASTY; ARM FX REPAIR)


Adenoidectomy, Gallbladder, Orthopedic, Tonsillectomy, Tubal Ligation


Respiratory:  No


Cardiac:  No


Neurological:  No


Pregnant:  No


Reproductive Disorders:  No


GYN History:  Tubal Ligation


Genitourinary:  Yes


Kidney Infection, Bladder Infection


Gastrointestinal:  Yes (S/P CHOLECYCTECTOMY)


Gall Bladder Disease


Musculoskeletal:  Yes (ARM FRACTURE REPAIR)


Fractures


Endocrine:  Yes


Hypothyroidsim


HEENT:  No


Cancer:  No


Psychosocial:  No


Integumentary:  No


Blood Disorders:  No





Family Medical History


No Pertinent Family Hx





Physical Exam


Vital Signs





Vital Signs - First Documented








 9/21/20





 18:30


 


Temp 36.4


 


Pulse 80


 


Resp 20


 


B/P (MAP) 125/94 (104)


 


Pulse Ox 100


 


O2 Delivery Room Air





Capillary Refill :


Height/Weight/BMI


Height: 5'7.00"


Weight: 164lbs. 0.0oz. [id number]; 25.5 BMI


Method:Stated


General Appearance:  WD/WN, no apparent distress


Neck:  normal inspection


Respiratory:  normal breath sounds, no respiratory distress, no accessory muscle

use


Cardiovascular:  regular rate, rhythm, no murmur


Gastrointestinal:  normal bowel sounds, soft, no organomegaly, no pulsatile 

mass; No distended, No guarding; rebound (EQUIVOCAL), tenderness (MODERATE RLQ 

TENDERNESS, MILD RIGHT FLANK AND SUPRAPUBIC TENDERNESS); No hernia, No mass


Extremities:  normal inspection


Back:  no vertebral tenderness, CVA tenderness (R)


Neurologic/Psychiatric:  CNs II-XII nml as tested, no motor/sensory deficits, 

alert, normal mood/affect, oriented x 3


Skin:  normal color, warm/dry; No rash





Progress/Results/Core Measures


Results/Orders


Lab Results





Laboratory Tests








Test


 9/21/20


18:30 9/21/20


18:40 Range/Units


 


 


Urine Color YELLOW    


 


Urine Clarity SL CLOUDY    


 


Urine pH 7.0   5-9  


 


Urine Specific Gravity 1.015 L  1.016-1.022  


 


Urine Protein NEGATIVE   NEGATIVE  


 


Urine Glucose (UA) NEGATIVE   NEGATIVE  


 


Urine Ketones NEGATIVE   NEGATIVE  


 


Urine Nitrite NEGATIVE   NEGATIVE  


 


Urine Bilirubin NEGATIVE   NEGATIVE  


 


Urine Urobilinogen 1.0   < = 1.0  MG/DL


 


Urine Leukocyte Esterase 2+ H  NEGATIVE  


 


Urine RBC (Auto) TRACE-I   NEGATIVE  


 


Urine RBC 0-2    /HPF


 


Urine WBC 25-50 H   /HPF


 


Urine Crystals NONE    /LPF


 


Urine Bacteria LARGE H   /HPF


 


Urine Casts NONE    /LPF


 


Urine Mucus NEGATIVE    /LPF


 


Urine Culture Indicated YES    


 


White Blood Count


 


 8.1 


 4.3-11.0


10^3/uL


 


Red Blood Count


 


 5.15 


 4.35-5.85


10^6/uL


 


Hemoglobin  15.2  11.5-16.0  G/DL


 


Hematocrit  45  35-52  %


 


Mean Corpuscular Volume  87  80-99  FL


 


Mean Corpuscular Hemoglobin  30  25-34  PG


 


Mean Corpuscular Hemoglobin


Concent 


 34 


 32-36  G/DL





 


Red Cell Distribution Width  12.9  10.0-14.5  %


 


Platelet Count


 


 330 


 130-400


10^3/uL


 


Mean Platelet Volume  8.8  7.4-10.4  FL


 


Neutrophils (%) (Auto)  51  42-75  %


 


Lymphocytes (%) (Auto)  37  12-44  %


 


Monocytes (%) (Auto)  10  0-12  %


 


Eosinophils (%) (Auto)  2  0-10  %


 


Basophils (%) (Auto)  0  0-10  %


 


Neutrophils # (Auto)  4.1  1.8-7.8  X 10^3


 


Lymphocytes # (Auto)  3.0  1.0-4.0  X 10^3


 


Monocytes # (Auto)  0.8  0.0-1.0  X 10^3


 


Eosinophils # (Auto)


 


 0.1 


 0.0-0.3


10^3/uL


 


Basophils # (Auto)


 


 0.0 


 0.0-0.1


10^3/uL


 


Sodium Level  140  135-145  MMOL/L


 


Potassium Level  3.5 L 3.6-5.0  MMOL/L


 


Chloride Level  103    MMOL/L


 


Carbon Dioxide Level  27  21-32  MMOL/L


 


Anion Gap  10  5-14  MMOL/L


 


Blood Urea Nitrogen  22 H 7-18  MG/DL


 


Creatinine


 


 0.98 


 0.60-1.30


MG/DL


 


Estimat Glomerular Filtration


Rate 


 > 60 


  





 


BUN/Creatinine Ratio  22   


 


Glucose Level  81    MG/DL


 


Calcium Level  8.8  8.5-10.1  MG/DL


 


Corrected Calcium  8.6  8.5-10.1  MG/DL


 


Total Bilirubin  0.2  0.1-1.0  MG/DL


 


Aspartate Amino Transf


(AST/SGOT) 


 57 H


 5-34  U/L





 


Alanine Aminotransferase


(ALT/SGPT) 


 94 H


 0-55  U/L





 


Alkaline Phosphatase  96    U/L


 


Total Protein  7.1  6.4-8.2  GM/DL


 


Albumin  4.3  3.2-4.5  GM/DL


 


Amylase Level  48    U/L


 


Lipase  51  8-78  U/L








My Orders





Orders - GRACE KIRBY DO


Ed Iv/Invasive Line Start (9/21/20 18:36)


Urine Pregnancy Bedside (9/21/20 18:36)


Amylase (9/21/20 18:36)


Cbc With Automated Diff (9/21/20 18:36)


Comprehensive Metabolic Panel (9/21/20 18:36)


Lipase (9/21/20 18:36)


Ua Culture If Indicated (9/21/20 18:36)


Urine Culture (9/21/20 18:30)


Ketorolac Injection (Toradol Injection) (9/21/20 19:45)


Ceftriaxone  For Iv Use (Rocephin  For I (9/21/20 19:45)


Ondansetron Injection (Zofran Injectio (9/21/20 19:45)


Ct Abd/Pelv W (Appendicitis) (9/21/20 19:42)


Acute Abd Series (9/21/20 19:42)


Iohexol Injection (Omnipaque 350 Mg/Ml 1 (9/21/20 20:30)


Received Contrast (Hold Metformin- Contr (9/21/20 20:30)


Ns (Ivpb) (Sodium Chloride 0.9% Ivpb Bag (9/21/20 20:30)


Rx-Hydrocodone/Apap 5-325 Mg (Rx-Vicodin (9/21/20 21:15)





Medications Given in ED





Current Medications








 Medications  Dose


 Ordered  Sig/Sung


 Route  Start Time


 Stop Time Status Last Admin


Dose Admin


 


 Ceftriaxone


 Sodium 1000 mg/


 Sterile Water  10 ml @ 


 200 mls/hr  ONCE  ONCE


 IV  9/21/20 19:45


 9/21/20 19:47 DC 9/21/20 20:03


200 MLS/HR


 


 Iohexol  100 ml  ONCE  ONCE


 IV  9/21/20 20:30


 9/21/20 20:31 DC 9/21/20 20:42


100 ML


 


 Ketorolac


 Tromethamine  30 mg  ONCE  ONCE


 IVP  9/21/20 19:45


 9/21/20 19:46 DC 9/21/20 20:01


30 MG


 


 Ondansetron HCl  4 mg  ONCE  ONCE


 IVP  9/21/20 19:45


 9/21/20 19:46 DC 9/21/20 19:58


4 MG


 


 Sodium Chloride  100 ml  ONCE  ONCE


 IV  9/21/20 20:30


 9/21/20 20:31 DC 9/21/20 20:42


100 ML








Vital Signs/I&O











 9/21/20





 18:30


 


Temp 36.4


 


Pulse 80


 


Resp 20


 


B/P (MAP) 125/94 (104)


 


Pulse Ox 100


 


O2 Delivery Room Air











Progress


Progress Note :  


Progress Note


GIVEN ZOFRAN AND TORADOL, WITH IMPROVEMENT IN SYMPTOMS


ALSO GIVEN ROCEPHIN FOR UTI





Diagnostic Imaging





Comments


ABDOMEN XRAYS PER RADIOLOGIST REPORT AT 2017


IMPRESSION: 1. No identified acute abdominal radiographic abnormality. 2. No 

identified acute cardiopulmonary abnormality. 





CT ABDOMEN/PELVIS--PER RADIOLOGIST REPORT AT 2054


IMPRESSION: 


CT ABDOMEN AND PELVIS.


1. Right ovarian follicle with small amount of free pelvic fluid


which may relate to physiologic findings.


2. No identified acute abnormality in the abdomen or pelvis.


   Reviewed:  Reviewed by Me





Departure


Communication (Admissions)


2057--SPOKE WITH DR. VIVAS, SHE WILL SEE PT IN OFFICE TOMORROW FOR RECHECK, 

WILL DEFER ANY RX'S FOR ANTIBIOTICS AND PAIN MEDICATION TO HER. 


PT ADVISED THAT IF SYMPTOMS WORSEN, SHE IS TO RETURN TO ER





Impression





   Primary Impression:  


   RLQ abdominal pain


   Additional Impressions:  


   UTI (urinary tract infection)


   Follicular cyst of right ovary


Disposition:  01 HOME, SELF-CARE


Condition:  Improved





Departure-Patient Inst.


Referrals:  


FANTASMA VIVAS DO (PCP)


Primary Care Physician








NAOMIE CAMPO DO (Family)


Primary Care Physician


Patient Instructions:  Severe Abdominal Pain, Adult (DC), Ovarian Cyst (DC), 

Urinary Tract Infection, Adult (DC)





Add. Discharge Instructions:  


CLEAR LIQUIDS TONIGHT, THEN NOTHING TO EAT OR DRINK AFTER MIDNIGHT





IBUPROFEN AS NEEDED FOR PAIN 





FOLLOW UP WITH DR VIVAS TOMORROW FOR FURTHER CARE, RETURN TO ER IF SYMPTOMS 

WORSEN





All discharge instructions reviewed with patient and/or family. Voiced u

nderstanding.











GRACE KIRBY DO                 Sep 21, 2020 18:54

## 2020-09-21 NOTE — DIAGNOSTIC IMAGING REPORT
PROCEDURE: CT abdomen and pelvis with contrast, rule out

appendicitis.



TECHNIQUE: Multiple contiguous axial images were obtained through

the abdomen and pelvis after the administration of intravenous

contrast. All CT scans use one or more of the following dose

optimizing techniques: automated exposure control, MA and/or KvP

adjustment based on patient size and exam type or iterative

reconstruction. 



DATE: September 21, 2020.



COMPARISON: Abdominal radiographs September 21, 2020. 



INDICATION: 40-year-old female, right-sided abdominal and flank

pain.



FINDINGS: 



There is mild dependent atelectasis in the lung bases. The heart

is not enlarged. There is no pericardial effusion.



The liver is unremarkable in size and contour. There is no

identified liver lesion. The main, right, and left portal veins

are patent. The patient is status post cholecystectomy. There is

no biliary ductal dilation. Unremarkable appearance of the

pancreas. The spleen is normal in size. The adrenal glands are

unremarkable.



Unremarkable appearance of the renal parenchyma. The urinary

collecting systems are not distended. There is no identified

renal or ureteral stone. There are pelvic calcifications

consistent with phleboliths. The urinary bladder is unremarkable.



There is a right ovarian follicle. There is a very small amount

of free pelvic fluid.



The intestinal tract is not distended. There is no evidence of

acute appendicitis. There is no free intraperitoneal air. There

is no drainable fluid collection.



There is no identified abnormally enlarged lymph node in the

abdomen or pelvis which meets CT size criteria for adenopathy.



There is no identified acute bony abnormality. There is mild disc

height loss at L5-S1.



IMPRESSION: 

CT ABDOMEN AND PELVIS.

1. Right ovarian follicle with small amount of free pelvic fluid

which may relate to physiologic findings.

2. No identified acute abnormality in the abdomen or pelvis.















Dictated by: 



  Dictated on workstation # [id number]

## 2021-06-07 ENCOUNTER — HOSPITAL ENCOUNTER (EMERGENCY)
Dept: HOSPITAL 75 - ER | Age: 41
Discharge: HOME | End: 2021-06-07
Payer: COMMERCIAL

## 2021-06-07 VITALS — SYSTOLIC BLOOD PRESSURE: 121 MMHG | DIASTOLIC BLOOD PRESSURE: 68 MMHG

## 2021-06-07 VITALS — HEIGHT: 67.01 IN | BODY MASS INDEX: 25.12 KG/M2 | WEIGHT: 160.06 LBS

## 2021-06-07 DIAGNOSIS — U07.1: Primary | ICD-10-CM

## 2021-06-07 DIAGNOSIS — R79.89: ICD-10-CM

## 2021-06-07 DIAGNOSIS — Z88.5: ICD-10-CM

## 2021-06-07 LAB
ALBUMIN SERPL-MCNC: 4 GM/DL (ref 3.2–4.5)
ALP SERPL-CCNC: 154 U/L (ref 40–136)
ALT SERPL-CCNC: 298 U/L (ref 0–55)
BASOPHILS # BLD AUTO: 0 10^3/UL (ref 0–0.1)
BASOPHILS NFR BLD AUTO: 0 % (ref 0–10)
BILIRUB SERPL-MCNC: 0.5 MG/DL (ref 0.1–1)
BUN/CREAT SERPL: 12
CALCIUM SERPL-MCNC: 8.8 MG/DL (ref 8.5–10.1)
CHLORIDE SERPL-SCNC: 97 MMOL/L (ref 98–107)
CO2 SERPL-SCNC: 30 MMOL/L (ref 21–32)
CREAT SERPL-MCNC: 1.21 MG/DL (ref 0.6–1.3)
EOSINOPHIL # BLD AUTO: 0 10^3/UL (ref 0–0.3)
EOSINOPHIL NFR BLD AUTO: 0 % (ref 0–10)
GFR SERPLBLD BASED ON 1.73 SQ M-ARVRAT: 49 ML/MIN
GLUCOSE SERPL-MCNC: 86 MG/DL (ref 70–105)
HCT VFR BLD CALC: 48 % (ref 35–52)
HGB BLD-MCNC: 16.4 G/DL (ref 11.5–16)
LYMPHOCYTES # BLD AUTO: 1.9 X 10^3 (ref 1–4)
LYMPHOCYTES NFR BLD AUTO: 28 % (ref 12–44)
MANUAL DIFFERENTIAL PERFORMED BLD QL: NO
MCH RBC QN AUTO: 29 PG (ref 25–34)
MCHC RBC AUTO-ENTMCNC: 34 G/DL (ref 32–36)
MCV RBC AUTO: 85 FL (ref 80–99)
MONOCYTES # BLD AUTO: 0.7 X 10^3 (ref 0–1)
MONOCYTES NFR BLD AUTO: 10 % (ref 0–12)
NEUTROPHILS # BLD AUTO: 4.4 X 10^3 (ref 1.8–7.8)
NEUTROPHILS NFR BLD AUTO: 62 % (ref 42–75)
PLATELET # BLD: 259 10^3/UL (ref 130–400)
PMV BLD AUTO: 8.4 FL (ref 9–12.2)
POTASSIUM SERPL-SCNC: 3 MMOL/L (ref 3.6–5)
PROT SERPL-MCNC: 6.9 GM/DL (ref 6.4–8.2)
SODIUM SERPL-SCNC: 139 MMOL/L (ref 135–145)
WBC # BLD AUTO: 7 10^3/UL (ref 4.3–11)

## 2021-06-07 PROCEDURE — 36415 COLL VENOUS BLD VENIPUNCTURE: CPT

## 2021-06-07 PROCEDURE — 84703 CHORIONIC GONADOTROPIN ASSAY: CPT

## 2021-06-07 PROCEDURE — 85379 FIBRIN DEGRADATION QUANT: CPT

## 2021-06-07 PROCEDURE — 86141 C-REACTIVE PROTEIN HS: CPT

## 2021-06-07 PROCEDURE — 85025 COMPLETE CBC W/AUTO DIFF WBC: CPT

## 2021-06-07 PROCEDURE — 71045 X-RAY EXAM CHEST 1 VIEW: CPT

## 2021-06-07 PROCEDURE — 80053 COMPREHEN METABOLIC PANEL: CPT

## 2021-06-07 PROCEDURE — 84145 PROCALCITONIN (PCT): CPT

## 2021-06-07 PROCEDURE — 80329 ANALGESICS NON-OPIOID 1 OR 2: CPT

## 2021-06-07 NOTE — DIAGNOSTIC IMAGING REPORT
INDICATION: Cough, fever, and hypotension.



COMPARISON: Comparison made with prior examination from

09/21/2020.



FINDINGS: The heart size, mediastinal configuration, and

pulmonary vascularity are within normal limits. There is no

pleural effusion, pneumothorax, or pneumonia. The osseous

structures are unremarkable. 



IMPRESSION: No acute cardiopulmonary abnormality.



Dictated by: 



  Dictated on workstation # [id number]

## 2021-06-07 NOTE — ED GENERAL
General


Chief Complaint:  General Problems/Pain


Stated Complaint:  COVID POSITIVE/ FEVER


Nursing Triage Note:  


PT TO ROOM 10 WITH C/O FEVER, COUGH, HYPOTENSION. PT IS COVID POS. PT STATES SHE




DOES NOT KNOW WHAT HER BP OR TEMP WAS. PT SENT OVER FROM URGENT CARE. PT STATES 


SHE WAS TOLD SHE WAS DEHYDRATED. PT STATES SHE HAS NOT HAD ANY TYLENOL OR 


IBUPROFEN TODAY.


Nursing Sepsis Screen:  Possible Severe Sepsis Risk


Source of Information:  Patient


Exam Limitations:  No Limitations





History of Present Illness


Date Seen by Provider:  Jun 7, 2021


Time Seen by Provider:  11:48


Initial Comments


ER by private vehicle with reports of hypotension.  She is Covid positive, she 

became symptomatic on the 30th, tested positive on Sunday.  She was given 

Decadron and Zithromax, denies improvement and is quite miserable.  She reports 

dizziness, nausea, body aches and persistent cough.  Unvaccinated against Covid.


Timing/Duration:  1 Week, Constant


Severity:  Moderate


Associated Systoms:  Cough





Allergies and Home Medications


Allergies


Coded Allergies:  


     morphine (Unverified  Allergy, Mild, 10/23/09)





Home Medications


Cefuroxime Axetil 500 Mg Tablet, 500 MG PO BID


   Prescribed by: FANTASMA VIVAS on 11/29/17 1722


Cephalexin 500 Mg Tablet, 500 MG PO BID


   Prescribed by: SABRINA BELLO on 9/22/20 1157


Ondansetron HCl 8 Mg Tablet, 8 MG PO Q6H


   Prescribed by: FANTASMA VIVAS on 11/29/17 1721


Thyroid,Pork 130 Mg Tablet, 130 MG PO DAILY, (Reported)





Patient Home Medication List


Home Medication List Reviewed:  Yes





Review of Systems


Review of Systems


Constitutional:  see HPI, chills, fever, malaise


EENTM:  see HPI


Respiratory:  see HPI, cough


Cardiovascular:  no symptoms reported


Genitourinary:  no symptoms reported


Musculoskeletal:  no symptoms reported


Skin:  no symptoms reported


Psychiatric/Neurological:  See HPI, Headache


Hematologic/Lymphatic:  No Symptoms Reported


Immunological/Allergic:  no symptoms reported





Past Medical-Social-Family Hx


Patient Social History


Alcohol Use:  Denies Use


Smoking Status:  Never a Smoker


2nd Hand Smoke Exposure:  No


Recent Infectious Disease Expo:  No


Recent Hopitalizations:  No





Seasonal Allergies


Seasonal Allergies:  No





Past Medical History


Surgeries:  Yes (TUMMY TUCK/ABDOMINOPLASTY; ARM FX REPAIR)


Adenoidectomy, Gallbladder, Orthopedic, Tonsillectomy, Tubal Ligation


Respiratory:  No


Cardiac:  No


Neurological:  No


Reproductive Disorders:  No


GYN History:  Tubal Ligation


Genitourinary:  Yes


Kidney Infection, Bladder Infection


Gastrointestinal:  Yes (S/P CHOLECYCTECTOMY)


Gall Bladder Disease


Musculoskeletal:  Yes (ARM FRACTURE REPAIR)


Fractures


Endocrine:  Yes


Hypothyroidsim


HEENT:  No


Cancer:  No


Psychosocial:  No


Integumentary:  No


Blood Disorders:  No





Family Medical History


No Pertinent Family Hx





Physical Exam


Vital Signs





Vital Signs - First Documented








 6/7/21





 11:41


 


Temp 37.6


 


Pulse 102


 


Resp 17


 


B/P (MAP) 116/89 (98)


 


O2 Delivery Room Air





Capillary Refill : Less Than 3 Seconds


Height, Weight, BMI


Height: 5'7.00"


Weight: 164lbs. 0.0oz. 74.090548ns; 25.00 BMI


Method:Stated


General Appearance:  No Apparent Distress, WD/WN, Other (No distress, laying in 

bed.  Lethargic.  She is neither hypoxic nor hypotensive.  She is 94% on room 

air here.  Heart rate 96.  Oxygen saturation 116/89.  No accessory muscle use)


Eyes:  Bilateral Eye Normal Inspection, Bilateral Eye PERRL


HEENT:  PERRL/EOMI, TMs Normal


Neck:  Full Range of Motion, Normal Inspection


Respiratory:  No Accessory Muscle Use, No Respiratory Distress


Cardiovascular:  Regular Rate, Rhythm, Normal Peripheral Pulses


Gastrointestinal:  Normal Bowel Sounds, Non Tender, Soft


Extremity:  Normal Capillary Refill, Normal Inspection


Neurologic/Psychiatric:  Alert, Oriented x3


Skin:  Normal Color, Warm/Dry





Progress/Results/Core Measures


Suspected Sepsis


Recent Fever Within 48 Hours:  Yes


Infection Criteria Present:  Documented Infection


New/Unexplained  Altered Menta:  No


Sepsis Screen:  Possible Severe Sepsis Risk


SIRS


Temperature: 


Pulse: 102 


Respiratory Rate: 17


 


Laboratory Tests


6/7/21 12:00: White Blood Count 7.0


Blood Pressure 116 /89 


Mean: 98


 





Laboratory Tests


6/7/21 12:00: 


Creatinine 1.21, Platelet Count 259, Total Bilirubin 0.5








Results/Orders


Lab Results





Laboratory Tests








Test


 6/7/21


12:00 6/7/21


12:23 Range/Units


 


 


White Blood Count


 7.0 


 


 4.3-11.0


10^3/uL


 


Red Blood Count


 5.63 H


 


 3.80-5.11


10^6/uL


 


Hemoglobin 16.4 H  11.5-16.0  g/dL


 


Hematocrit 48   35-52  %


 


Mean Corpuscular Volume 85   80-99  fL


 


Mean Corpuscular Hemoglobin 29   25-34  pg


 


Mean Corpuscular Hemoglobin


Concent 34 


 


 32-36  g/dL





 


Red Cell Distribution Width 11.9   10.0-14.5  %


 


Platelet Count


 259 


 


 130-400


10^3/uL


 


Mean Platelet Volume 8.4 L  9.0-12.2  fL


 


Immature Granulocyte % (Auto) 0    %


 


Neutrophils (%) (Auto) 62   42-75  %


 


Lymphocytes (%) (Auto) 28   12-44  %


 


Monocytes (%) (Auto) 10   0-12  %


 


Eosinophils (%) (Auto) 0   0-10  %


 


Basophils (%) (Auto) 0   0-10  %


 


Neutrophils # (Auto) 4.4   1.8-7.8  X 10^3


 


Lymphocytes # (Auto) 1.9   1.0-4.0  X 10^3


 


Monocytes # (Auto) 0.7   0.0-1.0  X 10^3


 


Eosinophils # (Auto)


 0.0 


 


 0.0-0.3


10^3/uL


 


Basophils # (Auto)


 0.0 


 


 0.0-0.1


10^3/uL


 


Immature Granulocyte # (Auto)


 0.0 


 


 0.0-0.1


10^3/uL


 


Sodium Level 139   135-145  MMOL/L


 


Potassium Level 3.0 L  3.6-5.0  MMOL/L


 


Chloride Level 97 L    MMOL/L


 


Carbon Dioxide Level 30   21-32  MMOL/L


 


Anion Gap 12   5-14  MMOL/L


 


Blood Urea Nitrogen 14   7-18  MG/DL


 


Creatinine


 1.21 


 


 0.60-1.30


MG/DL


 


Estimat Glomerular Filtration


Rate 49 


 


  





 


BUN/Creatinine Ratio 12    


 


Glucose Level 86     MG/DL


 


Calcium Level 8.8   8.5-10.1  MG/DL


 


Corrected Calcium 8.8   8.5-10.1  MG/DL


 


Total Bilirubin 0.5   0.1-1.0  MG/DL


 


Aspartate Amino Transf


(AST/SGOT) 62 H


 


 5-34  U/L





 


Alanine Aminotransferase


(ALT/SGPT) 298 H


 


 0-55  U/L





 


Alkaline Phosphatase 154 H    U/L


 


C-Reactive Protein High


Sensitivity 1.96 H


 


 0.00-0.50


MG/DL


 


Total Protein 6.9   6.4-8.2  GM/DL


 


Albumin 4.0   3.2-4.5  GM/DL


 


Procalcitonin 0.04   <0.10  NG/ML


 


Serum Pregnancy Test,


Qualitative NEGATIVE 


 


 NEGATIVE  





 


Acetaminophen Level < 10 L  10-30  UG/ML


 


D-Dimer


 


 0.48 


 0.00-0.49


UG/ML








My Orders





Orders - SOILA MALDONADO APRN


Cbc With Automated Diff (6/7/21 11:46)


Comprehensive Metabolic Panel (6/7/21 11:46)


Hs C Reactive Protein (6/7/21 11:46)


Hcg,Qualitative Serum (6/7/21 11:46)


Fibrin Degradation Products (6/7/21 11:46)


Chest 1 View, Ap/Pa Only (6/7/21 11:46)


Ed Iv/Invasive Line Start (6/7/21 11:46)


Ns Iv 500 Ml (Sodium Chloride 0.9%) (6/7/21 12:00)


Ketorolac Injection (Toradol Injection) (6/7/21 12:00)


Meclizine Tablet (Antivert Tablet) (6/7/21 12:00)


Ondansetron Injection (Zofran Injectio (6/7/21 12:00)


Procalcitonin (Pct) (6/7/21 11:52)


Ns Iv 500 Ml (Sodium Chloride 0.9%) (6/7/21 12:45)


Potassium Chloride (Tablet) (K Dur Table (6/7/21 12:45)


Acetaminophen (6/7/21 13:20)





Medications Given in ED





Current Medications








 Medications  Dose


 Ordered  Sig/Sung


 Route  Start Time


 Stop Time Status Last Admin


Dose Admin


 


 Ketorolac


 Tromethamine  15 mg  ONCE  ONCE


 IVP  6/7/21 12:00


 6/7/21 12:01 DC 6/7/21 11:54


15 MG


 


 Meclizine HCl  25 mg  ONCE  ONCE


 PO  6/7/21 12:00


 6/7/21 12:01 DC 6/7/21 11:54


25 MG


 


 Ondansetron HCl  8 mg  ONCE  ONCE


 IVP  6/7/21 12:00


 6/7/21 12:01 DC 6/7/21 11:54


8 MG


 


 Potassium Chloride  40 meq  ONCE  ONCE


 PO  6/7/21 12:45


 6/7/21 12:46 DC 6/7/21 12:48


40 MEQ








Vital Signs/I&O











 6/7/21





 11:41


 


Temp 37.6


 


Pulse 102


 


Resp 17


 


B/P (MAP) 116/89 (98)


 


O2 Delivery Room Air





Capillary Refill : Less Than 3 Seconds








Blood Pressure Mean:                    98











Departure


Communication (Admissions)


1319-Oxygen saturation remains 96 to 97% on room air heart rate 81 blood 

pressure 114 systolic.  Labs are unremarkable.  Discussed with her the elevated 

liver enzymes and the need for follow-up.  She states has been taking a lot of 

Tylenol PM and NyQuil. Ill add an apap level. No indication for admission nor 

does she meet criteria for monoclonal antibody infusion. Will dc to home, return

precautions if APAP level WNL.





Impression





   Primary Impression:  


   COVID-19


   Additional Impression:  


   Elevated LFTs


Disposition:  01 HOME, SELF-CARE


Condition:  Stable





Departure-Patient Inst.


Decision time for Depature:  13:21


Referrals:  


FANTASMA VIVAS DO (PCP)


Primary Care Physician








NAOMIE CAMPO DO (Family)


Primary Care Physician


Patient Instructions:  COVID-19 ED





Add. Discharge Instructions:  


1.  Avoid Tylenol (acetaminophen) products.  Use Motrin/ibuprofen instead.  

Follow-up for repeat liver enzymes and reevaluation next week.





All discharge instructions reviewed with patient and/or family. Voiced 

understanding.





Copy


Copies To 1:   NAOMIE CAMPO PETER J APRN              Jun 7, 2021 11:50